# Patient Record
Sex: FEMALE | Race: WHITE | NOT HISPANIC OR LATINO | Employment: FULL TIME | URBAN - NONMETROPOLITAN AREA
[De-identification: names, ages, dates, MRNs, and addresses within clinical notes are randomized per-mention and may not be internally consistent; named-entity substitution may affect disease eponyms.]

---

## 2019-12-21 ENCOUNTER — HOSPITAL ENCOUNTER (EMERGENCY)
Facility: HOSPITAL | Age: 49
Discharge: HOME | End: 2019-12-21
Attending: EMERGENCY MEDICINE
Payer: COMMERCIAL

## 2019-12-21 ENCOUNTER — APPOINTMENT (EMERGENCY)
Dept: RADIOLOGY | Facility: HOSPITAL | Age: 49
End: 2019-12-21
Payer: COMMERCIAL

## 2019-12-21 VITALS
HEART RATE: 94 BPM | OXYGEN SATURATION: 99 % | WEIGHT: 175.93 LBS | DIASTOLIC BLOOD PRESSURE: 77 MMHG | HEIGHT: 65 IN | SYSTOLIC BLOOD PRESSURE: 113 MMHG | TEMPERATURE: 97.6 F | RESPIRATION RATE: 18 BRPM | BODY MASS INDEX: 29.31 KG/M2

## 2019-12-21 DIAGNOSIS — M25.562 ACUTE PAIN OF LEFT KNEE: Primary | ICD-10-CM

## 2019-12-21 PROCEDURE — 99283 EMERGENCY DEPT VISIT LOW MDM: CPT

## 2019-12-21 PROCEDURE — 99284 EMERGENCY DEPT VISIT MOD MDM: CPT | Performed by: PHYSICIAN ASSISTANT

## 2019-12-21 PROCEDURE — 73562 X-RAY EXAM OF KNEE 3: CPT

## 2019-12-21 RX ORDER — NAPROXEN 500 MG/1
500 TABLET ORAL 2 TIMES DAILY WITH MEALS
Qty: 30 TABLET | Refills: 0 | Status: SHIPPED | OUTPATIENT
Start: 2019-12-21

## 2019-12-21 RX ORDER — HYDROCHLOROTHIAZIDE 12.5 MG/1
12.5 TABLET ORAL DAILY
COMMUNITY

## 2019-12-21 RX ORDER — LOSARTAN POTASSIUM 25 MG/1
25 TABLET ORAL DAILY
COMMUNITY

## 2019-12-21 NOTE — DISCHARGE INSTRUCTIONS
Rest, ice, compression, elevation  Use knee immobilizer as directed  Take anti-inflammatory as directed with food  Can supplement with OTC tylenol  Continue your ultram as needed  Schedule follow up with orthopedics for further evaluation

## 2019-12-21 NOTE — ED PROVIDER NOTES
History  Chief Complaint   Patient presents with    Knee Pain     left knee pain for the past two weeks  she reports increased difficulty walking due to that knee for a week now  denies any injury or trauma  patient taking motrin and advil which was last taken last night  47year old female presents with spouse ambulatory from home for evaluation of left knee pain  Pt denies any known injury or trauma  Denies any recent falls  She notes symptoms started about 3 weeks ago but worse over the past 1 5 weeks  No prior evaluation  She notes pain throughout knee but mostly medial aspect  She also feels it is more swollen  She c/o grinding and popping when she walks  Has not given out on her  Pain does not radiate  Pain aggravated by walking and especially doing steps  Pain feels improved with rest   Tried motrin without significant relief  No prior knee problems  PMH hypertension, otherwise unremarkable  History provided by:  Patient   used: No        Prior to Admission Medications   Prescriptions Last Dose Informant Patient Reported? Taking?   hydrochlorothiazide (HYDRODIURIL) 12 5 mg tablet Unknown at Unknown time  Yes No   Sig: Take 12 5 mg by mouth daily   losartan (COZAAR) 25 mg tablet Unknown at Unknown time  Yes No   Sig: Take 25 mg by mouth daily      Facility-Administered Medications: None       Past Medical History:   Diagnosis Date    Hypertension        Past Surgical History:   Procedure Laterality Date    CHOLECYSTECTOMY         History reviewed  No pertinent family history  I have reviewed and agree with the history as documented  Social History     Tobacco Use    Smoking status: Current Every Day Smoker     Types: E-Cigarettes    Smokeless tobacco: Never Used   Substance Use Topics    Alcohol use: Yes    Drug use: Never        Review of Systems   Constitutional: Negative  Negative for chills, fatigue and fever  HENT: Negative    Negative for congestion, rhinorrhea and sore throat  Eyes: Negative  Negative for visual disturbance  Respiratory: Negative  Negative for cough, shortness of breath and wheezing  Cardiovascular: Negative  Negative for chest pain, palpitations and leg swelling  Gastrointestinal: Negative  Negative for abdominal pain, constipation, diarrhea, nausea and vomiting  Genitourinary: Negative  Negative for dysuria, flank pain, frequency and hematuria  Musculoskeletal: Positive for arthralgias and joint swelling  Negative for back pain, myalgias and neck pain  Skin: Negative  Negative for rash  Neurological: Negative  Negative for dizziness, light-headedness, numbness and headaches  Psychiatric/Behavioral: Negative  Negative for confusion  All other systems reviewed and are negative  Physical Exam  Physical Exam   Constitutional: She is oriented to person, place, and time  She appears well-developed and well-nourished  No distress  HENT:   Head: Normocephalic and atraumatic  Right Ear: External ear normal    Left Ear: External ear normal    Nose: Nose normal    Mouth/Throat: Uvula is midline, oropharynx is clear and moist and mucous membranes are normal  No oropharyngeal exudate  Eyes: Pupils are equal, round, and reactive to light  Conjunctivae and EOM are normal  No scleral icterus  Neck: Trachea normal and normal range of motion  Neck supple  No tracheal deviation present  Cardiovascular: Normal rate, regular rhythm, normal heart sounds and normal pulses  No murmur heard  Pulmonary/Chest: Effort normal and breath sounds normal  No respiratory distress  She has no wheezes  She has no rhonchi  She has no rales  Abdominal: Soft  Bowel sounds are normal  There is no tenderness  There is no rebound, no guarding and no CVA tenderness  Musculoskeletal: She exhibits no edema or tenderness (no calf tenderness b/l)  Left hip: Normal  She exhibits normal range of motion and no bony tenderness  Left knee: She exhibits decreased range of motion (increased pain with flexion; is able to obtain full extension) and swelling  She exhibits no ecchymosis, no deformity, no laceration, no erythema, normal alignment, no LCL laxity, normal patellar mobility and no MCL laxity  Left ankle: Normal  She exhibits normal range of motion  No tenderness  No appreciated knee instability on exam    Neurological: She is alert and oriented to person, place, and time  She has normal strength and normal reflexes  No cranial nerve deficit or sensory deficit  She exhibits normal muscle tone  She displays a negative Romberg sign  Gait abnormal  Coordination normal  GCS eye subscore is 4  GCS verbal subscore is 5  GCS motor subscore is 6  Is able to ambulate without assistance   Skin: Skin is warm and dry  Capillary refill takes less than 2 seconds  No rash noted  No cyanosis or erythema  Psychiatric: She has a normal mood and affect  Her speech is normal and behavior is normal    Nursing note and vitals reviewed  Vital Signs  ED Triage Vitals [12/21/19 1236]   Temperature Pulse Respirations Blood Pressure SpO2   97 6 °F (36 4 °C) 94 18 113/77 99 %      Temp Source Heart Rate Source Patient Position - Orthostatic VS BP Location FiO2 (%)   Temporal Monitor Sitting Left arm --      Pain Score       Worst Possible Pain           Vitals:    12/21/19 1236   BP: 113/77   Pulse: 94   Patient Position - Orthostatic VS: Sitting         Visual Acuity      ED Medications  Medications - No data to display    Diagnostic Studies  Results Reviewed     None                 XR knee 3 views left non injury    (Results Pending)          Independently viewed and interpreted by me - no acute osseous findings; pending official read  Procedures  Procedures         ED Course  ED Course as of Dec 21 1643   Sat Dec 21, 2019   1259 Findings discussed with pt  Will apply knee immobilizer and refer to ortho  Pt declines crutches     XR knee 3 views left non injury     No acute findings noted  Suspect knee sprain vs meniscal injury  Knee immobilizer placed  Pt declined crutches  Reviewed RICE therapy  Will Rx NSAID  PDMP was queried and pt receiving active ultram Rx  Advised to continue this as prescribed  Can supplement meds with OTC tylenol as needed  Should f/u outpatient with orthopedics or return for change in condition as outlined  Pt and spouse verbalized understanding and had no further questions  MDM  Number of Diagnoses or Management Options  Acute pain of left knee: new and requires workup     Amount and/or Complexity of Data Reviewed  Tests in the radiology section of CPT®: ordered and reviewed  Decide to obtain previous medical records or to obtain history from someone other than the patient: yes  Obtain history from someone other than the patient: yes  Review and summarize past medical records: yes  Independent visualization of images, tracings, or specimens: yes    Patient Progress  Patient progress: improved        Disposition  Final diagnoses:   Acute pain of left knee     Time reflects when diagnosis was documented in both MDM as applicable and the Disposition within this note     Time User Action Codes Description Comment    12/21/2019  1:07 PM Elicia Esquivel Add [[id number] [id number]] Acute pain of left knee       ED Disposition     ED Disposition Condition Date/Time Comment    Discharge Stable Sat Dec 21, 2019  1:06 PM Solitario Roberto discharge to home/self care              Follow-up Information     Follow up With Specialties Details Why Contact Info Additional [address] [facility] [place] [facility] Schedule an appointment as soon as possible for a visit   [address] [facility] [address]  [address] [place] [address] [facility] [place]  [address] [place], [place], [address]    [facility] Emergency Medicine  As needed Anh Hidalgo [id number]  [phone] [place] [facility], [facility], [place], [place], [address]          Discharge Medication List as of 12/21/2019  1:09 PM      START taking these medications    Details   naproxen (NAPROSYN) 500 mg tablet Take 1 tablet (500 mg total) by mouth 2 (two) times a day with meals, Starting Sat 12/21/2019, Normal           No discharge procedures on file      ED Provider  Electronically Signed by           Gillian Lopez PA-C  12/21/19 7799